# Patient Record
Sex: FEMALE | URBAN - METROPOLITAN AREA
[De-identification: names, ages, dates, MRNs, and addresses within clinical notes are randomized per-mention and may not be internally consistent; named-entity substitution may affect disease eponyms.]

---

## 2018-09-26 ENCOUNTER — APPOINTMENT (RX ONLY)
Dept: URBAN - METROPOLITAN AREA CLINIC 319 | Facility: CLINIC | Age: 78
Setting detail: DERMATOLOGY
End: 2018-09-26

## 2018-09-26 DIAGNOSIS — L98.8 OTHER SPECIFIED DISORDERS OF THE SKIN AND SUBCUTANEOUS TISSUE: ICD-10-CM

## 2018-09-26 PROCEDURE — ? TREATMENT REGIMEN

## 2018-09-26 PROCEDURE — ? BOTOX (U OR CC)

## 2018-09-26 NOTE — PROCEDURE: BOTOX (U OR CC)
Consent: Written consent obtained. Risks include but not limited to lid/brow ptosis, bruising, swelling, diplopia, temporary effect, incomplete chemical denervation.
Detail Level: Simple
Dilution (U/0.1 Cc): 3.3
Measure In Units Or Cc's?: cc
Post-Care Instructions: Patient instructed to not lie down for 4 hours and limit physical activity for 24 hours. Patient instructed not to travel by airplane for 48 hours.
Price Per Unit Or Per Cc In $ (Use Numbers Only, No Special Characters Or $): 462.00

## 2018-09-26 NOTE — HPI: COSMETIC (BOTOX)
Have You Had Botox Before?: has not had botox
Additional History: Her  of 50+ years just  in March. She is going back to [place] to see family and she would like to look fresh

## 2019-01-16 ENCOUNTER — APPOINTMENT (RX ONLY)
Dept: URBAN - METROPOLITAN AREA CLINIC 319 | Facility: CLINIC | Age: 79
Setting detail: DERMATOLOGY
End: 2019-01-16

## 2019-01-16 DIAGNOSIS — L98.8 OTHER SPECIFIED DISORDERS OF THE SKIN AND SUBCUTANEOUS TISSUE: ICD-10-CM

## 2019-01-16 PROCEDURE — ? TREATMENT REGIMEN

## 2019-01-16 PROCEDURE — ? BOTOX (U OR CC)

## 2019-01-16 NOTE — PROCEDURE: BOTOX (U OR CC)
Measure In Units Or Cc's?: cc
Detail Level: Simple
Consent: Written consent obtained. Risks include but not limited to lid/brow ptosis, bruising, swelling, diplopia, temporary effect, incomplete chemical denervation.
Price Per Unit Or Per Cc In $ (Use Numbers Only, No Special Characters Or $): 396.00
Post-Care Instructions: Patient instructed to not lie down for 4 hours and limit physical activity for 24 hours. Patient instructed not to travel by airplane for 48 hours.
Dilution (U/0.1 Cc): 3.3

## 2021-01-01 ENCOUNTER — OFFICE VISIT (OUTPATIENT)
Dept: URBAN - METROPOLITAN AREA CLINIC 54 | Facility: CLINIC | Age: 81
End: 2021-01-01
Payer: MEDICARE

## 2021-01-01 DIAGNOSIS — H40.9 GLAUCOMA: ICD-10-CM

## 2021-01-01 DIAGNOSIS — Z96.1 PRESENCE OF INTRAOCULAR LENS: ICD-10-CM

## 2021-01-01 DIAGNOSIS — H43.11 VITREOUS HEMORRHAGE, RIGHT EYE: Primary | ICD-10-CM

## 2021-01-01 DIAGNOSIS — H34.8111 CENTRAL RETINAL VEIN OCCLUSION OF RIGHT EYE W/ RETINAL NEOVASCULARIZATION: ICD-10-CM

## 2021-01-01 PROCEDURE — 99214 OFFICE O/P EST MOD 30 MIN: CPT | Performed by: OPHTHALMOLOGY

## 2021-01-01 PROCEDURE — 92134 CPTRZ OPH DX IMG PST SGM RTA: CPT | Performed by: OPHTHALMOLOGY

## 2021-01-01 ASSESSMENT — INTRAOCULAR PRESSURE
OS: 17
OD: 19

## 2021-03-10 NOTE — IMPRESSION/PLAN
Impression: Central retinal vein occlusion of right eye w/ retinal neovascularization: H34.8111.  Plan: Please see above

## 2021-03-10 NOTE — IMPRESSION/PLAN
Impression: S/P PPV/EL/GAS x  VH OD 10/2/19 right eye  H43.11
OCT OU= No SRF/IRF OU, /226 Plan: VH resolved, retina stable, no NVE/NVD.  Rec obs stressed importance of follow up

12 m OCT OU

## 2021-03-10 NOTE — IMPRESSION/PLAN
Impression: Glaucoma: H40.9. Plan: Stressed importance to continue care with Dr. Caridad Mars. CSM with lumigan.